# Patient Record
Sex: MALE | HISPANIC OR LATINO
[De-identification: names, ages, dates, MRNs, and addresses within clinical notes are randomized per-mention and may not be internally consistent; named-entity substitution may affect disease eponyms.]

---

## 2020-10-22 ENCOUNTER — DASHBOARD ENCOUNTERS (OUTPATIENT)
Age: 60
End: 2020-10-22

## 2020-10-22 ENCOUNTER — OFFICE VISIT (OUTPATIENT)
Dept: URBAN - METROPOLITAN AREA CLINIC 98 | Facility: CLINIC | Age: 60
End: 2020-10-22
Payer: COMMERCIAL

## 2020-10-22 DIAGNOSIS — K59.09 CHRONIC CONSTIPATION: ICD-10-CM

## 2020-10-22 DIAGNOSIS — K60.2 ACUTE ANAL FISSURE: ICD-10-CM

## 2020-10-22 PROBLEM — 77880009 RECTAL PAIN: Status: ACTIVE | Noted: 2020-10-22

## 2020-10-22 PROBLEM — 12063002 RECTAL BLEEDING: Status: ACTIVE | Noted: 2020-10-22

## 2020-10-22 PROCEDURE — G9903 PT SCRN TBCO ID AS NON USER: HCPCS | Performed by: INTERNAL MEDICINE

## 2020-10-22 PROCEDURE — G8427 DOCREV CUR MEDS BY ELIG CLIN: HCPCS | Performed by: INTERNAL MEDICINE

## 2020-10-22 PROCEDURE — 99213 OFFICE O/P EST LOW 20 MIN: CPT | Performed by: INTERNAL MEDICINE

## 2020-10-22 PROCEDURE — 1036F TOBACCO NON-USER: CPT | Performed by: INTERNAL MEDICINE

## 2020-10-22 PROCEDURE — G8484 FLU IMMUNIZE NO ADMIN: HCPCS | Performed by: INTERNAL MEDICINE

## 2020-10-22 PROCEDURE — 3017F COLORECTAL CA SCREEN DOC REV: CPT | Performed by: INTERNAL MEDICINE

## 2020-10-22 PROCEDURE — G8420 CALC BMI NORM PARAMETERS: HCPCS | Performed by: INTERNAL MEDICINE

## 2020-10-22 RX ORDER — HYDROCORTISONE ACETATE 25 MG/1
1 SUPPOSITORY SUPPOSITORY RECTAL TWICE A DAY
Qty: 28 | Refills: 0 | OUTPATIENT
Start: 2020-10-22 | End: 2020-11-04

## 2020-10-22 RX ORDER — NITROGLYCERIN 4 MG/G
APPLY TO AFFECTED AREA OINTMENT RECTAL TWICE A DAY
Qty: 1 | Refills: 2 | OUTPATIENT
Start: 2020-10-22 | End: 2021-01-19

## 2020-10-22 NOTE — PHYSICAL EXAM GASTROINTESTINAL
Abdomen , soft, nontender, nondistended , no guarding or rigidity , no masses palpable , normal bowel sounds , Liver and Spleen , no hepatomegaly present , no hepatosplenomegaly , liver nontender , spleen not palpable , Rectal , normal sphincter tone , no internal hemorrhoids, painful rectal exam no stools in rectal vault.

## 2020-10-22 NOTE — HPI-TODAY'S VISIT:
61 yo pt, wh 2 weeks ago had became constipated, w/ passage of hard, thick stools w/ +++ straining and painful dfecation, w/ rectal bleeding. Constipation resolved and he had continued to experience rectal bleeding w/ painful defecation. No abdominal pain and no melenic stools nor hematochezia. Has been on  no ASA / NSAIDs. No fever or chills. No constitutional sxs and denies cardiorespiratory sxs.  No other complaints after extensive ROS.